# Patient Record
Sex: MALE | Race: WHITE
[De-identification: names, ages, dates, MRNs, and addresses within clinical notes are randomized per-mention and may not be internally consistent; named-entity substitution may affect disease eponyms.]

---

## 2017-07-05 ENCOUNTER — HOSPITAL ENCOUNTER (EMERGENCY)
Dept: HOSPITAL 26 - MED | Age: 52
Discharge: HOME | End: 2017-07-05
Payer: SELF-PAY

## 2017-07-05 VITALS — DIASTOLIC BLOOD PRESSURE: 85 MMHG | SYSTOLIC BLOOD PRESSURE: 135 MMHG

## 2017-07-05 VITALS — HEIGHT: 71 IN | WEIGHT: 180 LBS | BODY MASS INDEX: 25.2 KG/M2

## 2017-07-05 VITALS — DIASTOLIC BLOOD PRESSURE: 90 MMHG | SYSTOLIC BLOOD PRESSURE: 149 MMHG
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
## 2017-07-05 DIAGNOSIS — S43.401A: Primary | ICD-10-CM
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
## 2017-07-05 NOTE — NUR
Patient discharged with v/s stable. Written and verbal after care instructions 
given and explained. 

Patient alert, oriented and verbalized understanding of instructions. 
Ambulatory with steady gait. All questions addressed prior to discharge. ID 
band removed. Patient advised to follow up with PMD. Rx of NAPROSYN 500 MG 
given. Patient educated on indication of medication including possible reaction 
and side effects. Opportunity to ask questions provided and answered.

## 2017-07-05 NOTE — NUR
51Y/M PATIENT PRESENTS TO ED WITH C/O RT. SHOULDER PAIN X 3 WKS  . PT STATES 
PAIN STARTED 3 WKS AGO, ON AND OFF, TODYA PAIN INCREASES, NO TRAUMA NOR 
INJUSRY; SKIN IS PINK/WARM/DRY; AAOX4 WITH EVEN AND STEADY GAIT; LUNGS CLEAR 
BL; HR EVEN AND REGULAR; PT DENIES ANY FEVER, CP, SOB, OR COUGH AT THIS TIME; 
PATIENT STATES PAIN OF 6/10 AT THIS TIME; VSS; PATIENT POSITIONED FOR COMFORT; 
HOB ELEVATED; BEDRAILS UP X2; BED DOWN. ER MD MADE AWARE OF PT STATUS.

## 2017-07-23 ENCOUNTER — HOSPITAL ENCOUNTER (EMERGENCY)
Dept: HOSPITAL 26 - MED | Age: 52
Discharge: HOME | End: 2017-07-23
Payer: SELF-PAY

## 2017-07-23 VITALS — WEIGHT: 180 LBS | BODY MASS INDEX: 25.2 KG/M2 | HEIGHT: 71 IN

## 2017-07-23 VITALS — SYSTOLIC BLOOD PRESSURE: 145 MMHG | DIASTOLIC BLOOD PRESSURE: 99 MMHG

## 2017-07-23 VITALS — DIASTOLIC BLOOD PRESSURE: 102 MMHG | SYSTOLIC BLOOD PRESSURE: 156 MMHG

## 2017-07-23 DIAGNOSIS — L01.00: Primary | ICD-10-CM

## 2017-07-23 DIAGNOSIS — F17.210: ICD-10-CM

## 2017-07-23 NOTE — NUR
Patient discharged with v/s stable. Written and verbal after care instructions 
given and explained. 

Patient alert, oriented and verbalized understanding of instructions. 
Ambulatory with steady gait. All questions addressed prior to discharge. ID 
band removed. Patient advised to follow up with PMD. Rx of Batroban viviana and 
Keflex given. Patient educated on indication of medication including possible 
reaction and side effects. Opportunity to ask questions provided and answered.

## 2019-11-17 ENCOUNTER — HOSPITAL ENCOUNTER (INPATIENT)
Dept: HOSPITAL 26 - MED | Age: 54
LOS: 1 days | Discharge: HOME | DRG: 720 | End: 2019-11-18
Payer: COMMERCIAL

## 2019-11-17 VITALS — SYSTOLIC BLOOD PRESSURE: 133 MMHG | DIASTOLIC BLOOD PRESSURE: 88 MMHG

## 2019-11-17 VITALS — DIASTOLIC BLOOD PRESSURE: 88 MMHG | SYSTOLIC BLOOD PRESSURE: 130 MMHG

## 2019-11-17 VITALS — DIASTOLIC BLOOD PRESSURE: 80 MMHG | SYSTOLIC BLOOD PRESSURE: 132 MMHG

## 2019-11-17 VITALS — DIASTOLIC BLOOD PRESSURE: 86 MMHG | SYSTOLIC BLOOD PRESSURE: 132 MMHG

## 2019-11-17 VITALS — BODY MASS INDEX: 28.98 KG/M2 | WEIGHT: 207 LBS | HEIGHT: 71 IN

## 2019-11-17 DIAGNOSIS — F17.210: ICD-10-CM

## 2019-11-17 DIAGNOSIS — A41.9: Primary | ICD-10-CM

## 2019-11-17 DIAGNOSIS — N12: ICD-10-CM

## 2019-11-17 LAB
ALBUMIN FLD-MCNC: 3.5 G/DL (ref 3.4–5)
AMYLASE SERPL-CCNC: 35 U/L (ref 25–115)
ANION GAP SERPL CALCULATED.3IONS-SCNC: 14.8 MMOL/L (ref 8–16)
APPEARANCE UR: (no result)
AST SERPL-CCNC: 26 U/L (ref 15–37)
BILIRUB SERPL-MCNC: 0.4 MG/DL (ref 0–1)
BILIRUB UR QL STRIP: NEGATIVE
BUN SERPL-MCNC: 9 MG/DL (ref 7–18)
CHLORIDE SERPL-SCNC: 99 MMOL/L (ref 98–107)
CO2 SERPL-SCNC: 26.4 MMOL/L (ref 21–32)
COLOR UR: (no result)
CREAT SERPL-MCNC: 0.9 MG/DL (ref 0.7–1.3)
ERYTHROCYTE [DISTWIDTH] IN BLOOD BY AUTOMATED COUNT: 13.8 % (ref 11.6–13.7)
GFR SERPL CREATININE-BSD FRML MDRD: 114 ML/MIN (ref 90–?)
GLUCOSE SERPL-MCNC: 133 MG/DL (ref 74–106)
GLUCOSE UR STRIP-MCNC: (no result) MG/DL
HCT VFR BLD AUTO: 45.3 % (ref 36–52)
HGB BLD-MCNC: 14.8 G/DL (ref 12–18)
HGB UR QL STRIP: (no result)
LEUKOCYTE ESTERASE UR QL STRIP: (no result)
LIPASE SERPL-CCNC: 70 U/L (ref 73–393)
LYMPHOCYTES NFR BLD MANUAL: 9 % (ref 20–46)
MCH RBC QN AUTO: 30 PG (ref 27–31)
MCHC RBC AUTO-ENTMCNC: 33 G/DL (ref 33–37)
MCV RBC AUTO: 92.2 FL (ref 80–94)
MONOCYTES NFR BLD MANUAL: 6 % (ref 5–12)
NITRITE UR QL STRIP: POSITIVE
PH UR STRIP: 6 [PH] (ref 5–9)
PLATELET # BLD AUTO: 226 K/UL (ref 140–450)
POTASSIUM SERPL-SCNC: 3.2 MMOL/L (ref 3.5–5.1)
RBC # BLD AUTO: 4.91 MIL/UL (ref 4.2–6.1)
RBC #/AREA URNS HPF: (no result) /HPF (ref 0–5)
SODIUM SERPL-SCNC: 137 MMOL/L (ref 136–145)
WBC # BLD AUTO: 20.1 K/UL (ref 4.8–10.8)
WBC,URINE: (no result) /HPF (ref 0–5)

## 2019-11-17 NOTE — NUR
PT IN BED AOX4, HE IS RESTING WITH EYES CLOSED BUT AROUSABLE TO NAME AND LIGHT TOUCH. PT 
DENIES PAIN AT THIS TIME. IV SITE 20G ON LEFT HAND INTACT AND ASYMPTOMATIC. N/S RUNNING AT 
100MLS/HR. V/S AS FOLLOWS : T 98.2 P 90 R 18 B/P 132/80 02 100% ON ROOM AIR. BED LOW SIDE 
RAILS UP X2 AND CALL BELL IN REACH.

## 2019-11-17 NOTE — NUR
MEDICATIONS BEIGN ADMINISTERED. PT TOLERATING WELL. PAIN 10/10 UPON 
ADMINISTRATION, WILL REEVALUATE

## 2019-11-17 NOTE — NUR
DR PEMBERTON IN TO SEE THE PATIENT. WILL WAIT FOR HIS NEW ORDERS. PATIENT BACK TO RESTING IN 
BED, NO COMPLAINTS A THIS TIME. WILL CONTINUE TO MONITOR PATIENT.

## 2019-11-17 NOTE — NUR
DR. PEMBERTON CALLED BACK, ASKED FOR ADMIT ORDERS. PATIENT NOW ON REGULAR DIET WITH PRN 
MEDICATIONS. PATIENT AND GIRLFRIEND TRINH AWARE. DOCTOR NILAY WILL BE IN LATER TO SEE 
PATIENT.

## 2019-11-17 NOTE — NUR
Patient will be admitted to care of [facility]. Admited to TELE.  Will go to room 
119. Belongings list completed.  Report to [place]/RN.

PATIENT REPORTS NO PAIN AT THIS TIME. REQUESTING FOOD, [place] NOTIFIED. VS STABLE. 
NS RUNNING  MLS PER HOUR, APPROX [phone] ML LEFT

## 2019-11-17 NOTE — NUR
NORMAL SALINE BAG REPLACED, NS RATE CHANGED TO 60MLS/HR. PT RESTING NO S/S OF PAIN OR 
DISTRESS NOTED, ALL UNIVERSAL FALLS PRECAUTIONS IN PLACE.

## 2019-11-17 NOTE — NUR
PT IN BED RESTING WITH EYES CLOSED, NO S/S OF PAIN OR DISTRESS NOTED. BED LOW, SIDE RAILS UP 
AND CALL CARRILLO IN EACH. NS RUNNING AS ORDERED.

## 2019-11-17 NOTE — NUR
PATIENT WHEELED ONTO FLOOR VIA GURNEY. PATIENT AMBULATED TO BED ON STEADY GAIT. REPORT 
RECEIVED FROM ER EDEN HOSKINS AT BEDSIDE FOR CONTINUITY OF CARE. PATIENT'S GIRLFRIEND TRINH AT 
BEDSIDE. PATIENT'S BELONGINGS LIST SIGNED BY AIDEE HARMON AND CONCETTA HARMON. PATIENT ON TELE MONITOR. IV 
SITE INTACT, ASYMPTOMATIC, CURRENTLY INFUSING IVF WELL. PATIENT HAS NO COMPLAINTS OF PAIN AT 
THIS TIME. ORIENTED PATIENT AND GIRLFRIEND TO FLOOR, VISITING HOURS, CALL LIGHT, BATHROOM. 
VERBALIZED PLAN OF CARE. THEY VERBALIZED UNDERSTANDING. UPDATED BOARD. CALL LIGHT WITHIN 
REACH. WILL CONTINUE TO MONITOR PATIENT.

## 2019-11-17 NOTE — NUR
REPORT GIVEN AT BEDSIDE FOR CONTINUITY OF CARE TO NIGHT SHIFT NURSEDANILO. PATIENT IN 
STABLE CONDITION.

## 2019-11-17 NOTE — NUR
C/O LOWER BACK PAIN, DYSURIA, AND NAUSEA X 2 DAYS. PAIN 10/10 SHARP AND 
CONTINUOUS. PT CONTINENT. URINE YELLOW/JOHNNY AND CLOUDY. DENIES FREQUENCY OR 
URGENCY. PT TACHY , ALERT AND AWAKE. BED IS DOWN, LOCKED, BED RAIL X 1.



MED HX:DENIES

## 2019-11-18 VITALS — SYSTOLIC BLOOD PRESSURE: 120 MMHG | DIASTOLIC BLOOD PRESSURE: 79 MMHG

## 2019-11-18 VITALS — DIASTOLIC BLOOD PRESSURE: 92 MMHG | SYSTOLIC BLOOD PRESSURE: 146 MMHG

## 2019-11-18 VITALS — SYSTOLIC BLOOD PRESSURE: 125 MMHG | DIASTOLIC BLOOD PRESSURE: 82 MMHG

## 2019-11-18 VITALS — DIASTOLIC BLOOD PRESSURE: 89 MMHG | SYSTOLIC BLOOD PRESSURE: 142 MMHG

## 2019-11-18 LAB
BASOPHILS # BLD AUTO: 0 K/UL (ref 0–0.22)
BASOPHILS NFR BLD AUTO: 0.3 % (ref 0–2)
EOSINOPHIL # BLD AUTO: 0.2 K/UL (ref 0–0.4)
EOSINOPHIL NFR BLD AUTO: 1.4 % (ref 0–4)
ERYTHROCYTE [DISTWIDTH] IN BLOOD BY AUTOMATED COUNT: 13.9 % (ref 11.6–13.7)
HCT VFR BLD AUTO: 39.1 % (ref 36–52)
HGB BLD-MCNC: 12.6 G/DL (ref 12–18)
LYMPHOCYTES # BLD AUTO: 2 K/UL (ref 2–11.5)
LYMPHOCYTES NFR BLD AUTO: 15.4 % (ref 20.5–51.1)
MCH RBC QN AUTO: 30 PG (ref 27–31)
MCHC RBC AUTO-ENTMCNC: 32 G/DL (ref 33–37)
MCV RBC AUTO: 92.8 FL (ref 80–94)
MONOCYTES # BLD AUTO: 0.9 K/UL (ref 0.8–1)
MONOCYTES NFR BLD AUTO: 7.1 % (ref 1.7–9.3)
NEUTROPHILS # BLD AUTO: 9.7 K/UL (ref 1.8–7.7)
NEUTROPHILS NFR BLD AUTO: 75.8 % (ref 42.2–75.2)
PLATELET # BLD AUTO: 221 K/UL (ref 140–450)
RBC # BLD AUTO: 4.21 MIL/UL (ref 4.2–6.1)
WBC # BLD AUTO: 12.8 K/UL (ref 4.8–10.8)

## 2019-11-18 NOTE — NUR
PT IS ALERT AND ORIENTED X4. DENIES ANY PAIN OR DISCOMFORT. PATIENT IS SITTING UP IN BED, 
RESTING QUIETLY.

## 2019-11-18 NOTE — NUR
PT IN BED V/S AS FOLLOWS: T 97.4 P 90 R 18 B/P 120/79 02 99% ON ROOM AIR. NO S/S OF PAIN OR 
DISTRESS NOTED AND ALL UNIVERSAL FALLS PRECAUTIONS IN PLACE.

## 2019-11-18 NOTE — NUR
PT IN BED SLEEPING IV SITE INTACT AND RUNNING N/S AT 60MLS/HR AS ORDERED. ALL UNIVERSAL 
FALLS PRECAUTIONS IN PLACE.

## 2019-11-18 NOTE — NUR
PT IN BED, NO C/O OF PAIN THIS SHIFT. PT SAID HE VOIDED X2 NO BLOOD IN URINE, V/S AS FOLLOWS 
: T 97.1 P 76 R 18 B/P 125/82 02 99% ON ROOM AIR. ALL UNIVERSAL FALLS PRECAUTIONS IN PLACE.

## 2019-11-18 NOTE — NUR
RECEIVED REPORT FROM NIGHT NURSE. PATIENT IS ASLEEP, ABLE TO WAKE. RESPIRATION EVEN AND 
UNLABORED. IV INTACT AND PATENT TO LEFT HAND WITH IVF NS @ 60ML/HR. TOLERATING WELL. WILL 
CONTINUE TO MONITOR.

## 2019-11-18 NOTE — NUR
PT DISCHARGED WITH FAMILY TO HOME VIA PRIVATE VEHICLE. ALL DISCHARGE INSTRUCTIONS GIVEN AND 
ALL BELONGINGS GIVEN WITH PATIENT. IV CANNULA REMOVED. BLEEDING CONTROLLED. ID BAND REMOVED.

## 2019-11-18 NOTE — NUR
PATIENT HAS BEEN SCREENED AND CATEGORIZED AS LOW NUTRITION RISK. PATIENT WILL BE SEEN WITHIN 
7 DAYS OF ADMISSION.



11/24/19



[address]